# Patient Record
Sex: FEMALE | Race: WHITE
[De-identification: names, ages, dates, MRNs, and addresses within clinical notes are randomized per-mention and may not be internally consistent; named-entity substitution may affect disease eponyms.]

---

## 2018-08-10 ENCOUNTER — HOSPITAL ENCOUNTER (OUTPATIENT)
Dept: HOSPITAL 19 - WSC | Age: 28
LOS: 3 days | Discharge: HOME | End: 2018-08-13
Attending: HOSPITAL
Payer: OTHER GOVERNMENT

## 2018-08-10 DIAGNOSIS — M62.08: Primary | ICD-10-CM

## 2019-08-29 ENCOUNTER — HOSPITAL ENCOUNTER (OUTPATIENT)
Dept: HOSPITAL 19 - SDCO | Age: 29
Discharge: HOME | End: 2019-08-29
Attending: INTERNAL MEDICINE
Payer: OTHER GOVERNMENT

## 2019-08-29 VITALS — SYSTOLIC BLOOD PRESSURE: 113 MMHG | HEART RATE: 60 BPM | DIASTOLIC BLOOD PRESSURE: 89 MMHG

## 2019-08-29 VITALS — WEIGHT: 164.02 LBS | HEIGHT: 64 IN | BODY MASS INDEX: 28 KG/M2

## 2019-08-29 VITALS — HEART RATE: 53 BPM | SYSTOLIC BLOOD PRESSURE: 107 MMHG | DIASTOLIC BLOOD PRESSURE: 65 MMHG

## 2019-08-29 VITALS — DIASTOLIC BLOOD PRESSURE: 72 MMHG | HEART RATE: 68 BPM | SYSTOLIC BLOOD PRESSURE: 113 MMHG

## 2019-08-29 VITALS — DIASTOLIC BLOOD PRESSURE: 68 MMHG | HEART RATE: 63 BPM | SYSTOLIC BLOOD PRESSURE: 111 MMHG | TEMPERATURE: 98.2 F

## 2019-08-29 VITALS — HEART RATE: 58 BPM | SYSTOLIC BLOOD PRESSURE: 103 MMHG | DIASTOLIC BLOOD PRESSURE: 69 MMHG

## 2019-08-29 VITALS — DIASTOLIC BLOOD PRESSURE: 72 MMHG | HEART RATE: 69 BPM | SYSTOLIC BLOOD PRESSURE: 113 MMHG

## 2019-08-29 VITALS — DIASTOLIC BLOOD PRESSURE: 69 MMHG | HEART RATE: 59 BPM | SYSTOLIC BLOOD PRESSURE: 103 MMHG

## 2019-08-29 VITALS — DIASTOLIC BLOOD PRESSURE: 89 MMHG | SYSTOLIC BLOOD PRESSURE: 113 MMHG | HEART RATE: 47 BPM

## 2019-08-29 DIAGNOSIS — R11.0: ICD-10-CM

## 2019-08-29 DIAGNOSIS — M54.5: ICD-10-CM

## 2019-08-29 DIAGNOSIS — R10.11: Primary | ICD-10-CM

## 2019-08-29 DIAGNOSIS — G89.29: ICD-10-CM

## 2019-08-29 DIAGNOSIS — Z79.899: ICD-10-CM

## 2019-08-29 DIAGNOSIS — Z90.49: ICD-10-CM

## 2019-08-29 DIAGNOSIS — K59.00: ICD-10-CM

## 2019-08-29 DIAGNOSIS — R53.83: ICD-10-CM

## 2019-08-29 DIAGNOSIS — K44.9: ICD-10-CM

## 2019-08-29 DIAGNOSIS — K46.9: ICD-10-CM

## 2019-08-29 DIAGNOSIS — K21.0: ICD-10-CM

## 2019-08-29 DIAGNOSIS — F17.210: ICD-10-CM

## 2019-08-29 DIAGNOSIS — R63.0: ICD-10-CM

## 2019-08-29 PROCEDURE — C1769 GUIDE WIRE: HCPCS

## 2019-08-29 NOTE — NUR
Patient returns to GI bay 4 per cart and transfers from cart to recliner.  IV
fluids infusing.  States that she is having some abdominal soreness.  Denies
need for pain medication and denies nausea.  Spouse at side and call light in
reach.

## 2019-08-29 NOTE — NUR
Patient is resting and states that she is having slight nausea.  Encouraged to
rest and limit intake until nausea passes.

## 2019-08-29 NOTE — NUR
Patient dismissed to home accompanied by spouse with dismissal instructions in
hand.  IV was discontinued and site is free of redness or swelling.  Provided
office number for questions or concerns.

## 2019-08-29 NOTE — NUR
Eating sandwich and offers no further complaints of pain or nausea.
Ambulated across the hallway and was able to void and returns to room.

## 2019-08-29 NOTE — NUR
Dr. Dhaliwal in the room and talking with the patient.  All questions answered
and patient states that she is feeling slightly nauseated.  Order for
Phenergan receivied.